# Patient Record
Sex: MALE | Race: WHITE | NOT HISPANIC OR LATINO
[De-identification: names, ages, dates, MRNs, and addresses within clinical notes are randomized per-mention and may not be internally consistent; named-entity substitution may affect disease eponyms.]

---

## 2019-05-13 ENCOUNTER — APPOINTMENT (OUTPATIENT)
Dept: INTERNAL MEDICINE | Facility: CLINIC | Age: 34
End: 2019-05-13
Payer: COMMERCIAL

## 2019-05-13 VITALS
TEMPERATURE: 97.8 F | HEART RATE: 91 BPM | HEIGHT: 65 IN | DIASTOLIC BLOOD PRESSURE: 115 MMHG | SYSTOLIC BLOOD PRESSURE: 150 MMHG | WEIGHT: 215 LBS | BODY MASS INDEX: 35.82 KG/M2 | OXYGEN SATURATION: 94 %

## 2019-05-13 DIAGNOSIS — Z82.49 FAMILY HISTORY OF ISCHEMIC HEART DISEASE AND OTHER DISEASES OF THE CIRCULATORY SYSTEM: ICD-10-CM

## 2019-05-13 DIAGNOSIS — Z83.3 FAMILY HISTORY OF DIABETES MELLITUS: ICD-10-CM

## 2019-05-13 DIAGNOSIS — F17.200 NICOTINE DEPENDENCE, UNSPECIFIED, UNCOMPLICATED: ICD-10-CM

## 2019-05-13 DIAGNOSIS — Z78.9 OTHER SPECIFIED HEALTH STATUS: ICD-10-CM

## 2019-05-13 PROCEDURE — 99214 OFFICE O/P EST MOD 30 MIN: CPT

## 2019-05-13 RX ORDER — ALBUTEROL SULFATE 2.5 MG/3ML
(2.5 MG/3ML) SOLUTION RESPIRATORY (INHALATION)
Refills: 0 | Status: ACTIVE | COMMUNITY

## 2019-05-13 NOTE — HISTORY OF PRESENT ILLNESS
[FreeTextEntry1] : Loud snoring, witnessed apneas and daytime fatigue [de-identified] : This is a 34-year-old male who complains of loud snoring, witnessed apneas and marked daytime fatigue going on for at least 3 years. His pain getting worse over the past 6 months. He had a home sleep study a few years ago but the result is not available at this time. He has never used CPAP. He complains of waking up gasping for air about 5 times per night. He frequently wakes up during the night and feels very sleepy during the day. His bedtime is 9 PM and awakens at 5. He is up every hour to urinate and also wakes up because of gasping for air. He feels exhausted during the day. He is 5 feet 5 weight 215 pounds. He has gained about 20 pounds over the past 6 months. He works in natural gas construction. He travels frequently. There is a known history of hypertension but he currently is off medication. He otherwise feels well. He also states that when he is awake and lies supine he feels constriction in his throat region.

## 2019-05-13 NOTE — ASSESSMENT
[FreeTextEntry1] : Patient's current blood pressure is 140/110. Patient clearly has risk factors for significant sleep apnea. I will start patient on amlodipine 5 mg daily. I will also arrange for an in lab sleep study. The patient is to return in one month

## 2019-05-29 ENCOUNTER — TRANSCRIPTION ENCOUNTER (OUTPATIENT)
Age: 34
End: 2019-05-29

## 2019-07-03 ENCOUNTER — APPOINTMENT (OUTPATIENT)
Dept: INTERNAL MEDICINE | Facility: CLINIC | Age: 34
End: 2019-07-03
Payer: COMMERCIAL

## 2019-07-03 VITALS
HEIGHT: 65 IN | OXYGEN SATURATION: 97 % | SYSTOLIC BLOOD PRESSURE: 160 MMHG | BODY MASS INDEX: 35.82 KG/M2 | HEART RATE: 114 BPM | DIASTOLIC BLOOD PRESSURE: 120 MMHG | WEIGHT: 215 LBS

## 2019-07-03 LAB
APPEARANCE: NORMAL
BILIRUBIN URINE: NORMAL
BLOOD URINE: NORMAL
COLOR: YELLOW
GLUCOSE QUALITATIVE U: NORMAL
KETONES URINE: NORMAL
LEUKOCYTE ESTERASE URINE: NORMAL
NITRITE URINE: NORMAL
PH URINE: 7
PROTEIN URINE: 30
SPECIFIC GRAVITY URINE: 1.02
UROBILINOGEN URINE: 0.2

## 2019-07-03 PROCEDURE — 99213 OFFICE O/P EST LOW 20 MIN: CPT

## 2019-07-03 NOTE — ASSESSMENT
[FreeTextEntry1] : Patient with severe hypertension. We'll increase amlodipine to 10 mg daily and start hydrochlorothiazide 25 mg daily. Patient is to return for blood pressure check next week

## 2019-07-03 NOTE — HISTORY OF PRESENT ILLNESS
[FreeTextEntry1] : Followup hypertension. [de-identified] : Patient's sleep study was denied by insurance. However he is scheduled for a home sleep study. He is on amlodipine 5 mg daily. He denies complaints. There is slight swelling of the ankles and feet. Current blood pressure 150/110.

## 2019-07-03 NOTE — PHYSICAL EXAM
[No Acute Distress] : no acute distress [Well Nourished] : well nourished [Well Developed] : well developed [Well-Appearing] : well-appearing [Normal Sclera/Conjunctiva] : normal sclera/conjunctiva [PERRL] : pupils equal round and reactive to light [EOMI] : extraocular movements intact [Normal Outer Ear/Nose] : the outer ears and nose were normal in appearance [Normal Oropharynx] : the oropharynx was normal [No JVD] : no jugular venous distention [No Lymphadenopathy] : no lymphadenopathy [Supple] : supple [Thyroid Normal, No Nodules] : the thyroid was normal and there were no nodules present [No Respiratory Distress] : no respiratory distress  [No Accessory Muscle Use] : no accessory muscle use [Normal Rate] : normal rate  [Clear to Auscultation] : lungs were clear to auscultation bilaterally [Regular Rhythm] : with a regular rhythm [Normal S1, S2] : normal S1 and S2 [No Murmur] : no murmur heard [No Carotid Bruits] : no carotid bruits [No Abdominal Bruit] : a ~M bruit was not heard ~T in the abdomen [No Varicosities] : no varicosities [Pedal Pulses Present] : the pedal pulses are present [No Edema] : there was no peripheral edema [No Palpable Aorta] : no palpable aorta [No Extremity Clubbing/Cyanosis] : no extremity clubbing/cyanosis [Soft] : abdomen soft [Non Tender] : non-tender [Non-distended] : non-distended [No Masses] : no abdominal mass palpated [No HSM] : no HSM [Normal Bowel Sounds] : normal bowel sounds [Normal Posterior Cervical Nodes] : no posterior cervical lymphadenopathy [Normal Anterior Cervical Nodes] : no anterior cervical lymphadenopathy [No CVA Tenderness] : no CVA  tenderness [No Spinal Tenderness] : no spinal tenderness [No Joint Swelling] : no joint swelling [No Rash] : no rash [Grossly Normal Strength/Tone] : grossly normal strength/tone [Coordination Grossly Intact] : coordination grossly intact [No Focal Deficits] : no focal deficits [Normal Gait] : normal gait [Normal Affect] : the affect was normal [Deep Tendon Reflexes (DTR)] : deep tendon reflexes were 2+ and symmetric [Normal Insight/Judgement] : insight and judgment were intact

## 2019-07-08 LAB
APPEARANCE: CLEAR
BACTERIA: NEGATIVE
BILIRUBIN URINE: NEGATIVE
BLOOD URINE: NEGATIVE
COLOR: YELLOW
GLUCOSE QUALITATIVE U: NEGATIVE
HYALINE CASTS: 0 /LPF
KETONES URINE: NEGATIVE
LEUKOCYTE ESTERASE URINE: NEGATIVE
MICROSCOPIC-UA: NORMAL
NITRITE URINE: NEGATIVE
PH URINE: 7
PROTEIN URINE: ABNORMAL
RED BLOOD CELLS URINE: 2 /HPF
SPECIFIC GRAVITY URINE: 1.02
SQUAMOUS EPITHELIAL CELLS: 0 /HPF
UROBILINOGEN URINE: NORMAL
WHITE BLOOD CELLS URINE: 0 /HPF

## 2019-07-12 ENCOUNTER — APPOINTMENT (OUTPATIENT)
Dept: INTERNAL MEDICINE | Facility: CLINIC | Age: 34
End: 2019-07-12
Payer: COMMERCIAL

## 2019-07-12 VITALS
DIASTOLIC BLOOD PRESSURE: 120 MMHG | HEIGHT: 65 IN | BODY MASS INDEX: 35.82 KG/M2 | HEART RATE: 103 BPM | WEIGHT: 215 LBS | OXYGEN SATURATION: 97 % | SYSTOLIC BLOOD PRESSURE: 150 MMHG

## 2019-07-12 PROCEDURE — 99214 OFFICE O/P EST MOD 30 MIN: CPT

## 2019-07-12 NOTE — HISTORY OF PRESENT ILLNESS
[FreeTextEntry1] : Followup hypertension. [de-identified] : The patient has not had sleep study as recommended. He is waiting approval for a home study. He currently is on amlodipine 10, hydrochlorothiazide 25. Blood pressure currently is between 140-150/110-120. Patient actually feels well.

## 2019-07-12 NOTE — PHYSICAL EXAM
[No Acute Distress] : no acute distress [Well Developed] : well developed [Well-Appearing] : well-appearing [Normal Sclera/Conjunctiva] : normal sclera/conjunctiva [PERRL] : pupils equal round and reactive to light [EOMI] : extraocular movements intact [Normal Outer Ear/Nose] : the outer ears and nose were normal in appearance [Normal Oropharynx] : the oropharynx was normal [No JVD] : no jugular venous distention [No Lymphadenopathy] : no lymphadenopathy [Supple] : supple [Thyroid Normal, No Nodules] : the thyroid was normal and there were no nodules present [No Accessory Muscle Use] : no accessory muscle use [No Respiratory Distress] : no respiratory distress  [Clear to Auscultation] : lungs were clear to auscultation bilaterally [Normal Rate] : normal rate  [Normal S1, S2] : normal S1 and S2 [Regular Rhythm] : with a regular rhythm [No Murmur] : no murmur heard [No Carotid Bruits] : no carotid bruits [No Abdominal Bruit] : a ~M bruit was not heard ~T in the abdomen [No Varicosities] : no varicosities [Pedal Pulses Present] : the pedal pulses are present [No Edema] : there was no peripheral edema [No Palpable Aorta] : no palpable aorta [No Extremity Clubbing/Cyanosis] : no extremity clubbing/cyanosis [Soft] : abdomen soft [Non-distended] : non-distended [Non Tender] : non-tender [No Masses] : no abdominal mass palpated [No HSM] : no HSM [Normal Anterior Cervical Nodes] : no anterior cervical lymphadenopathy [Normal Posterior Cervical Nodes] : no posterior cervical lymphadenopathy [Normal Bowel Sounds] : normal bowel sounds [No CVA Tenderness] : no CVA  tenderness [No Joint Swelling] : no joint swelling [No Spinal Tenderness] : no spinal tenderness [No Rash] : no rash [Grossly Normal Strength/Tone] : grossly normal strength/tone [No Focal Deficits] : no focal deficits [Coordination Grossly Intact] : coordination grossly intact [Normal Gait] : normal gait [Normal Affect] : the affect was normal [Deep Tendon Reflexes (DTR)] : deep tendon reflexes were 2+ and symmetric [de-identified] : obese [Normal Insight/Judgement] : insight and judgment were intact

## 2019-07-12 NOTE — ASSESSMENT
[FreeTextEntry1] : We'll add lisinopril 20 mg daily. We'll check routine labs. Patient is to return in 2 weeks.

## 2019-07-15 LAB
ALBUMIN SERPL ELPH-MCNC: 4.5 G/DL
ALP BLD-CCNC: 50 U/L
ALT SERPL-CCNC: 51 U/L
ANION GAP SERPL CALC-SCNC: 11 MMOL/L
AST SERPL-CCNC: 26 U/L
BASOPHILS # BLD AUTO: 0.05 K/UL
BASOPHILS NFR BLD AUTO: 0.9 %
BILIRUB SERPL-MCNC: 0.4 MG/DL
BUN SERPL-MCNC: 17 MG/DL
CALCIUM SERPL-MCNC: 9.8 MG/DL
CHLORIDE SERPL-SCNC: 103 MMOL/L
CHOLEST SERPL-MCNC: 149 MG/DL
CHOLEST/HDLC SERPL: 4.4 RATIO
CO2 SERPL-SCNC: 28 MMOL/L
CREAT SERPL-MCNC: 1.19 MG/DL
EOSINOPHIL # BLD AUTO: 0.09 K/UL
EOSINOPHIL NFR BLD AUTO: 1.7 %
ESTIMATED AVERAGE GLUCOSE: 111 MG/DL
GLUCOSE SERPL-MCNC: 97 MG/DL
HBA1C MFR BLD HPLC: 5.5 %
HCT VFR BLD CALC: 50.5 %
HDLC SERPL-MCNC: 34 MG/DL
HGB BLD-MCNC: 16.9 G/DL
IMM GRANULOCYTES NFR BLD AUTO: 0.2 %
LDLC SERPL CALC-MCNC: 76 MG/DL
LYMPHOCYTES # BLD AUTO: 1.75 K/UL
LYMPHOCYTES NFR BLD AUTO: 32.6 %
MAN DIFF?: NORMAL
MCHC RBC-ENTMCNC: 30.3 PG
MCHC RBC-ENTMCNC: 33.5 GM/DL
MCV RBC AUTO: 90.7 FL
MONOCYTES # BLD AUTO: 0.4 K/UL
MONOCYTES NFR BLD AUTO: 7.5 %
NEUTROPHILS # BLD AUTO: 3.06 K/UL
NEUTROPHILS NFR BLD AUTO: 57.1 %
PLATELET # BLD AUTO: 155 K/UL
POTASSIUM SERPL-SCNC: 3.8 MMOL/L
PROT SERPL-MCNC: 6.9 G/DL
RBC # BLD: 5.57 M/UL
RBC # FLD: 13 %
SODIUM SERPL-SCNC: 142 MMOL/L
TRIGL SERPL-MCNC: 195 MG/DL
WBC # FLD AUTO: 5.36 K/UL

## 2019-09-27 ENCOUNTER — MEDICATION RENEWAL (OUTPATIENT)
Age: 34
End: 2019-09-27

## 2019-10-14 ENCOUNTER — APPOINTMENT (OUTPATIENT)
Dept: INTERNAL MEDICINE | Facility: CLINIC | Age: 34
End: 2019-10-14
Payer: COMMERCIAL

## 2019-10-14 VITALS
OXYGEN SATURATION: 97 % | HEART RATE: 94 BPM | DIASTOLIC BLOOD PRESSURE: 120 MMHG | SYSTOLIC BLOOD PRESSURE: 160 MMHG | HEIGHT: 65 IN | BODY MASS INDEX: 36.65 KG/M2 | WEIGHT: 220 LBS

## 2019-10-14 PROCEDURE — 99214 OFFICE O/P EST MOD 30 MIN: CPT

## 2019-10-14 NOTE — PHYSICAL EXAM
[Well Nourished] : well nourished [No Acute Distress] : no acute distress [Well-Appearing] : well-appearing [Well Developed] : well developed [Normal Sclera/Conjunctiva] : normal sclera/conjunctiva [EOMI] : extraocular movements intact [PERRL] : pupils equal round and reactive to light [Normal Outer Ear/Nose] : the outer ears and nose were normal in appearance [Normal Oropharynx] : the oropharynx was normal [No JVD] : no jugular venous distention [No Lymphadenopathy] : no lymphadenopathy [Supple] : supple [No Respiratory Distress] : no respiratory distress  [Thyroid Normal, No Nodules] : the thyroid was normal and there were no nodules present [No Accessory Muscle Use] : no accessory muscle use [Normal Rate] : normal rate  [Clear to Auscultation] : lungs were clear to auscultation bilaterally [Regular Rhythm] : with a regular rhythm [Normal S1, S2] : normal S1 and S2 [No Murmur] : no murmur heard [No Abdominal Bruit] : a ~M bruit was not heard ~T in the abdomen [No Carotid Bruits] : no carotid bruits [No Varicosities] : no varicosities [Pedal Pulses Present] : the pedal pulses are present [No Palpable Aorta] : no palpable aorta [No Edema] : there was no peripheral edema [No Extremity Clubbing/Cyanosis] : no extremity clubbing/cyanosis [Soft] : abdomen soft [Non Tender] : non-tender [Non-distended] : non-distended [No Masses] : no abdominal mass palpated [No HSM] : no HSM [Normal Bowel Sounds] : normal bowel sounds [Normal Posterior Cervical Nodes] : no posterior cervical lymphadenopathy [Normal Anterior Cervical Nodes] : no anterior cervical lymphadenopathy [No CVA Tenderness] : no CVA  tenderness [No Spinal Tenderness] : no spinal tenderness [No Joint Swelling] : no joint swelling [Grossly Normal Strength/Tone] : grossly normal strength/tone [No Rash] : no rash [Coordination Grossly Intact] : coordination grossly intact [No Focal Deficits] : no focal deficits [Normal Gait] : normal gait [Normal Affect] : the affect was normal [Normal Insight/Judgement] : insight and judgment were intact

## 2019-10-14 NOTE — HISTORY OF PRESENT ILLNESS
[FreeTextEntry1] : Followup hypertension and sleep apnea. [de-identified] : Patient has stopped smoking for the past 10 days. He's lost 5 pounds. He had a sleep study which revealed severe sleep apnea with an AHI of 53.1. The patient admits that he occasionally misses doses of his antihypertensives. He denies complaints. His medications were reviewed include amlodipine 10, hydrochlorothiazide 25 and lisinopril 20 mg daily. Current blood pressure is 150/110.

## 2019-12-31 ENCOUNTER — MEDICATION RENEWAL (OUTPATIENT)
Age: 34
End: 2019-12-31

## 2019-12-31 RX ORDER — ALBUTEROL SULFATE 2.5 MG/3ML
(2.5 MG/3ML) SOLUTION RESPIRATORY (INHALATION)
Qty: 150 | Refills: 3 | Status: ACTIVE | COMMUNITY
Start: 2019-12-31 | End: 1900-01-01

## 2021-03-12 ENCOUNTER — NON-APPOINTMENT (OUTPATIENT)
Age: 36
End: 2021-03-12

## 2021-03-26 ENCOUNTER — APPOINTMENT (OUTPATIENT)
Dept: INTERNAL MEDICINE | Facility: CLINIC | Age: 36
End: 2021-03-26
Payer: COMMERCIAL

## 2021-03-26 VITALS
SYSTOLIC BLOOD PRESSURE: 110 MMHG | TEMPERATURE: 98.4 F | WEIGHT: 220 LBS | HEART RATE: 82 BPM | OXYGEN SATURATION: 100 % | BODY MASS INDEX: 36.65 KG/M2 | HEIGHT: 65 IN | DIASTOLIC BLOOD PRESSURE: 80 MMHG

## 2021-03-26 DIAGNOSIS — Z00.00 ENCOUNTER FOR GENERAL ADULT MEDICAL EXAMINATION W/OUT ABNORMAL FINDINGS: ICD-10-CM

## 2021-03-26 DIAGNOSIS — R06.83 SNORING: ICD-10-CM

## 2021-03-26 DIAGNOSIS — G47.33 OBSTRUCTIVE SLEEP APNEA (ADULT) (PEDIATRIC): ICD-10-CM

## 2021-03-26 DIAGNOSIS — R00.2 PALPITATIONS: ICD-10-CM

## 2021-03-26 DIAGNOSIS — I10 ESSENTIAL (PRIMARY) HYPERTENSION: ICD-10-CM

## 2021-03-26 PROCEDURE — 99072 ADDL SUPL MATRL&STAF TM PHE: CPT

## 2021-03-26 PROCEDURE — 93000 ELECTROCARDIOGRAM COMPLETE: CPT

## 2021-03-26 PROCEDURE — 36415 COLL VENOUS BLD VENIPUNCTURE: CPT

## 2021-03-26 PROCEDURE — 99395 PREV VISIT EST AGE 18-39: CPT | Mod: 25

## 2021-03-26 NOTE — HISTORY OF PRESENT ILLNESS
[FreeTextEntry1] : 35-year-old male for complete physical [de-identified] : Patient with history of severe hypertension, severe obstructive sleep apnea and mild intermittent asthma. He is also obese and a former smoker. Patient is doing quite well stopping smoking 3 months ago. His blood pressure last checked was 128/90. He still feels tired although he uses CPAP for about 5 hours per night. He only sleeps for 5 hours per night. His CPAP download shows an AHI of 6.4. He has lack of energy. He wants a testosterone level checked. He also complains of rare episodes of a fast heart rate that can go up well over 100. It can last for less a minute. It occurs rarely usually about once per month. He continues to work doing underground utilities. His weight is coming down it is now 220. Current blood pressure 110/80.

## 2021-03-26 NOTE — PHYSICAL EXAM
[No Acute Distress] : no acute distress [Well Developed] : well developed [Well-Appearing] : well-appearing [Normal Sclera/Conjunctiva] : normal sclera/conjunctiva [PERRL] : pupils equal round and reactive to light [EOMI] : extraocular movements intact [Normal Outer Ear/Nose] : the outer ears and nose were normal in appearance [Normal Oropharynx] : the oropharynx was normal [No JVD] : no jugular venous distention [No Lymphadenopathy] : no lymphadenopathy [Supple] : supple [Thyroid Normal, No Nodules] : the thyroid was normal and there were no nodules present [No Respiratory Distress] : no respiratory distress  [No Accessory Muscle Use] : no accessory muscle use [Clear to Auscultation] : lungs were clear to auscultation bilaterally [Normal Rate] : normal rate  [Regular Rhythm] : with a regular rhythm [Normal S1, S2] : normal S1 and S2 [No Murmur] : no murmur heard [No Carotid Bruits] : no carotid bruits [No Abdominal Bruit] : a ~M bruit was not heard ~T in the abdomen [No Varicosities] : no varicosities [Pedal Pulses Present] : the pedal pulses are present [No Edema] : there was no peripheral edema [No Palpable Aorta] : no palpable aorta [No Extremity Clubbing/Cyanosis] : no extremity clubbing/cyanosis [Soft] : abdomen soft [Non Tender] : non-tender [Non-distended] : non-distended [No Masses] : no abdominal mass palpated [No HSM] : no HSM [Normal Bowel Sounds] : normal bowel sounds [Normal Posterior Cervical Nodes] : no posterior cervical lymphadenopathy [Normal Anterior Cervical Nodes] : no anterior cervical lymphadenopathy [No CVA Tenderness] : no CVA  tenderness [No Spinal Tenderness] : no spinal tenderness [No Joint Swelling] : no joint swelling [Grossly Normal Strength/Tone] : grossly normal strength/tone [No Rash] : no rash [Coordination Grossly Intact] : coordination grossly intact [No Focal Deficits] : no focal deficits [Normal Gait] : normal gait [Deep Tendon Reflexes (DTR)] : deep tendon reflexes were 2+ and symmetric [Normal Affect] : the affect was normal [Normal Insight/Judgement] : insight and judgment were intact [de-identified] : obese

## 2021-03-26 NOTE — ASSESSMENT
[FreeTextEntry1] : The patient is doing quite well. Current blood pressure 110/80. Patient is advised to continue current medication and return to see me in 3 months. Labs have been drawn. Patient is advised if he continues to have episodes of palpitations he is referred to a cardiac evaluation

## 2021-04-04 LAB
25(OH)D3 SERPL-MCNC: 18.4 NG/ML
ALBUMIN SERPL ELPH-MCNC: 4.8 G/DL
ALP BLD-CCNC: 44 U/L
ALT SERPL-CCNC: 23 U/L
ANION GAP SERPL CALC-SCNC: 13 MMOL/L
AST SERPL-CCNC: 20 U/L
BASOPHILS # BLD AUTO: 0.05 K/UL
BASOPHILS NFR BLD AUTO: 0.8 %
BILIRUB SERPL-MCNC: 0.5 MG/DL
BUN SERPL-MCNC: 32 MG/DL
CALCIUM SERPL-MCNC: 9.8 MG/DL
CHLORIDE SERPL-SCNC: 104 MMOL/L
CHOLEST SERPL-MCNC: 207 MG/DL
CO2 SERPL-SCNC: 22 MMOL/L
CREAT SERPL-MCNC: 1.28 MG/DL
EOSINOPHIL # BLD AUTO: 0.07 K/UL
EOSINOPHIL NFR BLD AUTO: 1.2 %
ESTIMATED AVERAGE GLUCOSE: 108 MG/DL
GLUCOSE SERPL-MCNC: 101 MG/DL
HBA1C MFR BLD HPLC: 5.4 %
HCT VFR BLD CALC: 50.9 %
HDLC SERPL-MCNC: 40 MG/DL
HGB BLD-MCNC: 16.9 G/DL
IMM GRANULOCYTES NFR BLD AUTO: 0.3 %
LDLC SERPL CALC-MCNC: 128 MG/DL
LYMPHOCYTES # BLD AUTO: 1.72 K/UL
LYMPHOCYTES NFR BLD AUTO: 29 %
MAN DIFF?: NORMAL
MCHC RBC-ENTMCNC: 30.2 PG
MCHC RBC-ENTMCNC: 33.2 GM/DL
MCV RBC AUTO: 90.9 FL
MONOCYTES # BLD AUTO: 0.45 K/UL
MONOCYTES NFR BLD AUTO: 7.6 %
NEUTROPHILS # BLD AUTO: 3.62 K/UL
NEUTROPHILS NFR BLD AUTO: 61.1 %
NONHDLC SERPL-MCNC: 167 MG/DL
PLATELET # BLD AUTO: 152 K/UL
POTASSIUM SERPL-SCNC: 4.2 MMOL/L
PROT SERPL-MCNC: 7.5 G/DL
RBC # BLD: 5.6 M/UL
RBC # FLD: 12.6 %
SODIUM SERPL-SCNC: 139 MMOL/L
TESTOST SERPL-MCNC: 201 NG/DL
TRIGL SERPL-MCNC: 192 MG/DL
TSH SERPL-ACNC: 1.45 UIU/ML
WBC # FLD AUTO: 5.93 K/UL

## 2022-04-13 ENCOUNTER — RX RENEWAL (OUTPATIENT)
Age: 37
End: 2022-04-13

## 2022-04-13 RX ORDER — LISINOPRIL 40 MG/1
40 TABLET ORAL DAILY
Qty: 90 | Refills: 3 | Status: ACTIVE | COMMUNITY
Start: 2019-10-14 | End: 1900-01-01

## 2022-04-13 RX ORDER — AMLODIPINE BESYLATE 5 MG/1
5 TABLET ORAL DAILY
Qty: 90 | Refills: 3 | Status: ACTIVE | COMMUNITY
Start: 2019-05-13 | End: 1900-01-01

## 2022-11-08 RX ORDER — ALBUTEROL SULFATE 90 UG/1
108 (90 BASE) AEROSOL, METERED RESPIRATORY (INHALATION)
Qty: 3 | Refills: 3 | Status: ACTIVE | COMMUNITY
Start: 2022-11-08 | End: 1900-01-01

## 2023-01-01 NOTE — PHYSICAL EXAM

## 2023-03-28 ENCOUNTER — TRANSCRIPTION ENCOUNTER (OUTPATIENT)
Age: 38
End: 2023-03-28

## 2023-03-29 RX ORDER — ALBUTEROL SULFATE 90 UG/1
108 (90 BASE) AEROSOL, METERED RESPIRATORY (INHALATION)
Qty: 3 | Refills: 3 | Status: DISCONTINUED | COMMUNITY
Start: 2019-09-27 | End: 2023-03-29

## 2023-03-29 RX ORDER — ALBUTEROL SULFATE 2.5 MG/3ML
(2.5 MG/3ML) SOLUTION RESPIRATORY (INHALATION)
Qty: 3 | Refills: 1 | Status: DISCONTINUED | COMMUNITY
Start: 2019-09-27 | End: 2023-03-29

## 2023-03-29 RX ORDER — LISINOPRIL 20 MG/1
20 TABLET ORAL DAILY
Qty: 3 | Refills: 3 | Status: DISCONTINUED | COMMUNITY
Start: 2019-07-12 | End: 2023-03-29

## 2023-03-29 RX ORDER — HYDROCHLOROTHIAZIDE 25 MG/1
25 TABLET ORAL DAILY
Qty: 90 | Refills: 3 | Status: DISCONTINUED | COMMUNITY
Start: 2019-07-03 | End: 2023-03-29

## 2023-04-03 ENCOUNTER — NON-APPOINTMENT (OUTPATIENT)
Age: 38
End: 2023-04-03

## 2023-04-03 ENCOUNTER — APPOINTMENT (OUTPATIENT)
Dept: HEART AND VASCULAR | Facility: CLINIC | Age: 38
End: 2023-04-03
Payer: COMMERCIAL

## 2023-04-03 VITALS
TEMPERATURE: 96.6 F | WEIGHT: 215 LBS | HEIGHT: 65 IN | SYSTOLIC BLOOD PRESSURE: 118 MMHG | BODY MASS INDEX: 35.82 KG/M2 | DIASTOLIC BLOOD PRESSURE: 88 MMHG

## 2023-04-03 PROCEDURE — 93000 ELECTROCARDIOGRAM COMPLETE: CPT

## 2023-04-03 PROCEDURE — 99214 OFFICE O/P EST MOD 30 MIN: CPT | Mod: 25

## 2023-04-03 RX ORDER — METOPROLOL SUCCINATE 25 MG/1
25 TABLET, EXTENDED RELEASE ORAL
Refills: 0 | Status: ACTIVE | COMMUNITY

## 2023-04-03 RX ORDER — TICAGRELOR 90 MG/1
90 TABLET ORAL
Refills: 0 | Status: ACTIVE | COMMUNITY

## 2023-04-03 RX ORDER — SEMAGLUTIDE 1.34 MG/ML
2 INJECTION, SOLUTION SUBCUTANEOUS
Refills: 0 | Status: ACTIVE | COMMUNITY

## 2023-04-03 RX ORDER — ASPIRIN 81 MG/1
81 TABLET ORAL
Refills: 0 | Status: ACTIVE | COMMUNITY

## 2023-04-03 RX ORDER — ATORVASTATIN CALCIUM 80 MG/1
80 TABLET, FILM COATED ORAL
Refills: 0 | Status: ACTIVE | COMMUNITY

## 2023-04-03 NOTE — ASSESSMENT
[FreeTextEntry1] : NSTEMI/CAD- culprit lesion fixed, stable LAD stenosis (80% prox-mid)\par -we discussed options including medical therapy vs MIDCAB vs PCI at length. Given CCS II equivalent symptoms, reduced LVEF we both agreed that revascualrization is indicated. Pt would like to proceed with PCI after extensive discussion of benefits/risks.\par -plan for Staged PCI at [facility] in 4 wks (4/28)\par \par Systolic CHF\par -on ACE/BB\par -plan for revascularization and then re-assessment of LVEF\par -euvolemic on exam\par \par Smoker- counseled at length\par HLD- on statin\par HTN- on beta blocker, BP controlled\par \par Diarrhea- check labs today

## 2023-04-03 NOTE — REASON FOR VISIT
[FreeTextEntry1] : 36 y/o M with pmhx of NSTEMI/CAD (2v CAD s/p PCI of RCA with residual LAD stenosis, LVEF 45%) who presents for follow-up after discharge from hospital.\par Pt since discharge is doing well since\par Able to exert himself without any issues\par NYHA 1 symptoms\par Does report nausea and multiple episodes of watery diarrhea since 2 days prior\par +flushing\par \par \par \par EKG 3/28/2023- NSR\par \par Cardiac Cath 3/27/2023\par -2V CAD, Low SYNTAX\par 99% mid RCA (CORA 2 flow), 80% prox-mid LAD \par LVEF mildly reduced\par Successful PCI of the mid RCA with a 4.0x26mm RONAN\par \par \par TTE 3/27/2023\par CONCLUSIONS:\par \par 1. Normal left ventricular cavity size. The left ventricular systolic function is mildly decreased with an ejection fraction of 46 % There is global left ventricular hypokinesis.\par 2. There is normal left ventricular diastolic function.\par 3. Normal right ventricular cavity size and normal systolic function.\par 4. Trace mitral regurgitation.\par 5. No prior echocardiogram is available for comparison.\par

## 2023-04-03 NOTE — REVIEW OF SYSTEMS
[Feeling Fatigued] : not feeling fatigued [Seeing Double (Diplopia)] : no diplopia [Earache] : no earache [SOB] : no shortness of breath [Dyspnea on exertion] : not dyspnea during exertion [Chest Discomfort] : no chest discomfort [Cough] : no cough [Abdominal Pain] : abdominal pain [Nausea] : nausea [Vomiting] : vomiting [Dysphagia] : no dysphagia [Diarrhea] : diarrhea [Urinary Frequency] : no change in urinary frequency [Joint Pain] : no joint pain [Rash] : no rash [Negative] : Heme/Lymph

## 2023-04-05 LAB
ALBUMIN SERPL ELPH-MCNC: 4.5 G/DL
ALP BLD-CCNC: 51 U/L
ALT SERPL-CCNC: 21 U/L
ANION GAP SERPL CALC-SCNC: 14 MMOL/L
AST SERPL-CCNC: 17 U/L
BILIRUB SERPL-MCNC: 1 MG/DL
BUN SERPL-MCNC: 19 MG/DL
CALCIUM SERPL-MCNC: 9.6 MG/DL
CHLORIDE SERPL-SCNC: 104 MMOL/L
CO2 SERPL-SCNC: 22 MMOL/L
CREAT SERPL-MCNC: 1.05 MG/DL
EGFR: 94 ML/MIN/1.73M2
GLUCOSE SERPL-MCNC: 114 MG/DL
MAGNESIUM SERPL-MCNC: 1.9 MG/DL
POTASSIUM SERPL-SCNC: 4.8 MMOL/L
PROT SERPL-MCNC: 6.7 G/DL
SODIUM SERPL-SCNC: 141 MMOL/L

## 2023-04-17 RX ORDER — ALBUTEROL SULFATE 90 UG/1
108 (90 BASE) AEROSOL, METERED RESPIRATORY (INHALATION)
Qty: 3 | Refills: 3 | Status: ACTIVE | COMMUNITY
Start: 2021-02-08 | End: 1900-01-01

## 2023-04-22 ENCOUNTER — RESULT REVIEW (OUTPATIENT)
Age: 38
End: 2023-04-22

## 2023-04-28 ENCOUNTER — RESULT REVIEW (OUTPATIENT)
Age: 38
End: 2023-04-28

## 2023-05-04 ENCOUNTER — TRANSCRIPTION ENCOUNTER (OUTPATIENT)
Age: 38
End: 2023-05-04

## 2023-05-05 ENCOUNTER — APPOINTMENT (OUTPATIENT)
Dept: HEART AND VASCULAR | Facility: CLINIC | Age: 38
End: 2023-05-05
Payer: COMMERCIAL

## 2023-05-05 VITALS
HEIGHT: 65 IN | DIASTOLIC BLOOD PRESSURE: 70 MMHG | TEMPERATURE: 98.7 F | OXYGEN SATURATION: 98 % | SYSTOLIC BLOOD PRESSURE: 100 MMHG | WEIGHT: 215 LBS | HEART RATE: 91 BPM | BODY MASS INDEX: 35.82 KG/M2

## 2023-05-05 PROCEDURE — 99214 OFFICE O/P EST MOD 30 MIN: CPT

## 2023-05-06 RX ORDER — ATORVASTATIN CALCIUM 40 MG/1
40 TABLET, FILM COATED ORAL
Qty: 60 | Refills: 0 | Status: ACTIVE | COMMUNITY
Start: 2023-03-27

## 2023-05-06 NOTE — ASSESSMENT
[FreeTextEntry1] : CAD s/p PCI (hx of NSTEMI), chronic systolic CHF, obesity, HLD and strong FHX of CAD;\par \par on ASA/Brilinta\par d/c norvasc\par metoprolol to continue\par lisinopril to continue\par lipitor 80mg to continue\par will plan on repeating TTE in 3 months (post-revascularization), he is on BB/ACEi for systolic CHF which I suspect will improve\par Risk factor modifications, smoking cessation\par  (Dr. Reza)\par We had an extensive conversation about prevention\par

## 2023-05-06 NOTE — REASON FOR VISIT
[FreeTextEntry1] : 36 y/o M with pmhx of NSTEMI/CAD (2v CAD s/p PCI of RCA initially and then last week staged PCI of the LAD, LVEF 45%) who presents for follow-up after discharge from hospital.\par Pt since discharge is doing well since\par Denies any CP/SOB\par Has changed diet and is starting to exercise\par stopped smoking\par \par \par \par EKG 3/28/2023- NSR\par \par Cardiac Cath 3/27/2023\par -2V CAD, Low SYNTAX\par 99% mid RCA (CORA 2 flow), 80% prox-mid LAD \par LVEF mildly reduced\par Successful PCI of the mid RCA with a 4.0x26mm RONAN\par \par \par TTE 3/27/2023\par CONCLUSIONS:\par \par 1. Normal left ventricular cavity size. The left ventricular systolic function is mildly decreased with an ejection fraction of 46 % There is global left ventricular hypokinesis.\par 2. There is normal left ventricular diastolic function.\par 3. Normal right ventricular cavity size and normal systolic function.\par 4. Trace mitral regurgitation.\par 5. No prior echocardiogram is available for comparison.\par

## 2023-05-19 ENCOUNTER — NON-APPOINTMENT (OUTPATIENT)
Age: 38
End: 2023-05-19

## 2023-09-14 ENCOUNTER — TRANSCRIPTION ENCOUNTER (OUTPATIENT)
Age: 38
End: 2023-09-14

## 2024-03-01 ENCOUNTER — NON-APPOINTMENT (OUTPATIENT)
Age: 39
End: 2024-03-01

## 2024-03-04 ENCOUNTER — TRANSCRIPTION ENCOUNTER (OUTPATIENT)
Age: 39
End: 2024-03-04

## 2024-03-04 ENCOUNTER — RESULT REVIEW (OUTPATIENT)
Age: 39
End: 2024-03-04

## 2024-04-18 NOTE — ASSESSMENT
HR=50 bpm, GNRC=488/63 mmhg, SpO2=98.0 %, Resp=10 B/min, EtCO2=38 mmHg, Apnea=0 Seconds, Rose=2 [FreeTextEntry1] : Patient with severe hypertension and severe sleep apnea syndrome. We'll start an auto CPAP unit for sleep apnea. Will increase lisinopril to 40 mg daily. Patient is advised that he must take his medication regularly every day. He is to return in one month.

## 2024-05-21 ENCOUNTER — NON-APPOINTMENT (OUTPATIENT)
Age: 39
End: 2024-05-21

## 2024-05-22 ENCOUNTER — APPOINTMENT (OUTPATIENT)
Dept: PULMONOLOGY | Facility: CLINIC | Age: 39
End: 2024-05-22
Payer: COMMERCIAL

## 2024-05-22 ENCOUNTER — LABORATORY RESULT (OUTPATIENT)
Age: 39
End: 2024-05-22

## 2024-05-22 VITALS
BODY MASS INDEX: 37.65 KG/M2 | HEIGHT: 65 IN | OXYGEN SATURATION: 97 % | DIASTOLIC BLOOD PRESSURE: 70 MMHG | TEMPERATURE: 97.2 F | WEIGHT: 226 LBS | HEART RATE: 82 BPM | SYSTOLIC BLOOD PRESSURE: 110 MMHG

## 2024-05-22 DIAGNOSIS — J45.909 UNSPECIFIED ASTHMA, UNCOMPLICATED: ICD-10-CM

## 2024-05-22 PROCEDURE — G2211 COMPLEX E/M VISIT ADD ON: CPT | Mod: NC,1L

## 2024-05-22 PROCEDURE — 99203 OFFICE O/P NEW LOW 30 MIN: CPT

## 2024-05-22 RX ORDER — FLUTICASONE PROPIONATE 50 UG/1
50 SPRAY, METERED NASAL
Refills: 0 | Status: ACTIVE | COMMUNITY

## 2024-05-22 NOTE — DISCUSSION/SUMMARY
[FreeTextEntry1] : 39M here for evaluation of asthma  #Asthma  - Previously well controlled on albuterol only but with recent worsening of symptoms after viral URI - Doing well on ICS/LABA so will continue - Full PFTs ordered - Labs ordered to better characterize asthma

## 2024-05-22 NOTE — PHYSICAL EXAM
[No Acute Distress] : no acute distress [Normal Rate/Rhythm] : normal rate/rhythm [Normal S1, S2] : normal s1, s2 [No Murmurs] : no murmurs [No Resp Distress] : no resp distress [No Acc Muscle Use] : no acc muscle use [Clear to Auscultation Bilaterally] : clear to auscultation bilaterally [No Clubbing] : no clubbing [No Cyanosis] : no cyanosis [No Edema] : no edema [No Focal Deficits] : no focal deficits [Oriented x3] : oriented x3 [Normal Affect] : normal affect

## 2024-05-22 NOTE — HISTORY OF PRESENT ILLNESS
[Former] : former [Never] : never [TextBox_4] :  Mr. ERIKA BRUNO is a 39 year old M here for evaluation of asthma.  He has had asthma since childhood. He recalls having been on Advair and had allergy testing and received allergy shots in the past. In adulthood, he has had no issues with allergies and was only taking albuterol PRN for asthma. He also previously smoked about 0.5ppd but quit 2 yrs ago. In 3/2024 he was hospitalized at [facility] for viral URI 2/2 coronavirus. He did not require steroids at the time but since then has noted worsening asthma control and has been using his rescue inhaler 2-3 times per day. He recently saw his cardiologist Dr. Claros and was started on wixela BID and notices an improvement in his breathing. He has not had to use his rescue inhaler.

## 2024-05-23 LAB
BASOPHILS # BLD AUTO: 0.05 K/UL
BASOPHILS NFR BLD AUTO: 0.8 %
EOSINOPHIL # BLD AUTO: 0.11 K/UL
EOSINOPHIL NFR BLD AUTO: 1.7 %
HCT VFR BLD CALC: 50.4 %
HGB BLD-MCNC: 16.8 G/DL
IMM GRANULOCYTES NFR BLD AUTO: 0.2 %
LYMPHOCYTES # BLD AUTO: 1.64 K/UL
LYMPHOCYTES NFR BLD AUTO: 24.8 %
MAN DIFF?: NORMAL
MCHC RBC-ENTMCNC: 30 PG
MCHC RBC-ENTMCNC: 33.3 GM/DL
MCV RBC AUTO: 90 FL
MONOCYTES # BLD AUTO: 0.52 K/UL
MONOCYTES NFR BLD AUTO: 7.9 %
NEUTROPHILS # BLD AUTO: 4.29 K/UL
NEUTROPHILS NFR BLD AUTO: 64.6 %
PLATELET # BLD AUTO: 158 K/UL
RBC # BLD: 5.6 M/UL
RBC # FLD: 13 %
WBC # FLD AUTO: 6.62 K/UL

## 2024-05-24 LAB
A ALTERNATA IGE QN: <0.1 KUA/L
A FUMIGATUS IGE QN: <0.1 KUA/L
C ALBICANS IGE QN: <0.1 KUA/L
C HERBARUM IGE QN: <0.1 KUA/L
CAT DANDER IGE QN: <0.1 KUA/L
COMMON RAGWEED IGE QN: 0.24 KUA/L
D FARINAE IGE QN: 8.62 KUA/L
D PTERONYSS IGE QN: 6.38 KUA/L
DEPRECATED A ALTERNATA IGE RAST QL: 0 (ref 0–?)
DEPRECATED A FUMIGATUS IGE RAST QL: 0 (ref 0–?)
DEPRECATED C ALBICANS IGE RAST QL: 0
DEPRECATED C HERBARUM IGE RAST QL: 0 (ref 0–?)
DEPRECATED CAT DANDER IGE RAST QL: 0 (ref 0–?)
DEPRECATED COMMON RAGWEED IGE RAST QL: NORMAL (ref 0–?)
DEPRECATED D FARINAE IGE RAST QL: 3 (ref 0–?)
DEPRECATED D PTERONYSS IGE RAST QL: 3 (ref 0–?)
DEPRECATED DOG DANDER IGE RAST QL: 0 (ref 0–?)
DEPRECATED M RACEMOSUS IGE RAST QL: 0
DEPRECATED ROACH IGE RAST QL: 0 (ref 0–?)
DEPRECATED TIMOTHY IGE RAST QL: 0 (ref 0–?)
DEPRECATED WHITE OAK IGE RAST QL: 3 (ref 0–?)
DOG DANDER IGE QN: <0.1 KUA/L
M RACEMOSUS IGE QN: <0.1 KUA/L
ROACH IGE QN: <0.1 KUA/L
TIMOTHY IGE QN: <0.1 KUA/L
TOTAL IGE SMQN RAST: 192 KU/L
WHITE OAK IGE QN: 4.98 KUA/L

## 2024-08-22 ENCOUNTER — APPOINTMENT (OUTPATIENT)
Dept: PULMONOLOGY | Facility: CLINIC | Age: 39
End: 2024-08-22

## 2024-08-28 ENCOUNTER — APPOINTMENT (OUTPATIENT)
Dept: PULMONOLOGY | Facility: CLINIC | Age: 39
End: 2024-08-28
Payer: COMMERCIAL

## 2024-08-28 ENCOUNTER — NON-APPOINTMENT (OUTPATIENT)
Age: 39
End: 2024-08-28

## 2024-08-28 VITALS — WEIGHT: 236 LBS | HEIGHT: 65 IN | BODY MASS INDEX: 39.32 KG/M2

## 2024-08-28 DIAGNOSIS — I10 ESSENTIAL (PRIMARY) HYPERTENSION: ICD-10-CM

## 2024-08-28 DIAGNOSIS — E66.9 OBESITY, UNSPECIFIED: ICD-10-CM

## 2024-08-28 DIAGNOSIS — G47.33 OBSTRUCTIVE SLEEP APNEA (ADULT) (PEDIATRIC): ICD-10-CM

## 2024-08-28 PROCEDURE — 99213 OFFICE O/P EST LOW 20 MIN: CPT

## 2024-08-28 NOTE — PHYSICAL EXAM
[General Appearance - Well Developed] : well developed [General Appearance - Well Nourished] : well nourished [Elongated Uvula] : elongated uvula [III] : III [Heart Sounds] : normal S1 and S2 [Murmurs] : no murmurs [] : no respiratory distress [Auscultation Breath Sounds / Voice Sounds] : lungs were clear to auscultation bilaterally [No Focal Deficits] : no focal deficits [Oriented To Time, Place, And Person] : oriented to person, place, and time [Memory Recent] : recent memory was not impaired [FreeTextEntry1] : deviated septum

## 2024-08-28 NOTE — HISTORY OF PRESENT ILLNESS
[FreeTextEntry1] : no pmd 39 year old man with history of asthma, cad s.p stents, htn is here in the sleep center to address sleep apnea.  Patient is sleepy with Hancock sleepiness score of 14.  Patient has very loud snoring, does not have any witnessed apneas.  Patient's bedtime is around 10 PM wakes up in the morning around 5 AM.   He feels tired when he wakes up.  Patient drinks few cups of coffee during the daytime. Patient does not have any headaches or nocturia. He is not sleepy while driving.  He was diagnosed with sleep apnea and is on cpap for the last few years. Patient says its difficult to tolerate cpap, there is significant leaks with the mask and is uncomfortable using the cpap.  Patient wants to consider alternative options. Today we discussed about inspire therapy and weight loss.

## 2024-08-28 NOTE — ASSESSMENT
[FreeTextEntry1] : Alternative Therapies:  Inspire Therapy: Discussed as an option.  Discussed the procedure and next steps for inspire therapy.  We talked about AHI requirements and the BMI requirements for inspire therapy.  We also talked about drug-induced endoscopy and the procedure details.  Explained to him  about short-term side effects from the inspire along with tongue dysfunction and swallowing issues.  Explained to  him that most of the time these issues will resolve with time as the inflammation decreases.  We also talked about long-term complications of the inspire implant.  Patient understood all above and expressed understanding. Weight Loss: Emphasize the potential benefits of weight loss in managing sleep apnea. He is on trezepetide products for weight loss. Goal wt disucssed as 215. sleep study ordered to evaluate the degree of apnea. ENT referral to evaluate anatomy. CPAP Optimization:  Mask Adjustment: Explore options for different CPAP masks or adjustments to address leaks and improve comfort. Asked pt to consider masks without magnets given cad with stents. CPAP Settings: Reviewd and adjusted CPAP settings as needed to enhance therapy effectiveness and comfort. Lifestyle Modifications:  Sleep Hygiene: Reinforce good sleep hygiene practices, including maintaining a consistent sleep schedule and creating a restful sleep environment.

## 2025-04-01 ENCOUNTER — NON-APPOINTMENT (OUTPATIENT)
Age: 40
End: 2025-04-01

## 2025-06-13 ENCOUNTER — NON-APPOINTMENT (OUTPATIENT)
Age: 40
End: 2025-06-13

## 2025-06-25 ENCOUNTER — APPOINTMENT (OUTPATIENT)
Dept: PULMONOLOGY | Facility: CLINIC | Age: 40
End: 2025-06-25
Payer: COMMERCIAL

## 2025-06-25 VITALS
DIASTOLIC BLOOD PRESSURE: 70 MMHG | HEART RATE: 56 BPM | OXYGEN SATURATION: 96 % | HEIGHT: 65 IN | BODY MASS INDEX: 36.15 KG/M2 | WEIGHT: 217 LBS | SYSTOLIC BLOOD PRESSURE: 90 MMHG

## 2025-06-25 PROCEDURE — 99213 OFFICE O/P EST LOW 20 MIN: CPT
